# Patient Record
Sex: FEMALE | Race: WHITE
[De-identification: names, ages, dates, MRNs, and addresses within clinical notes are randomized per-mention and may not be internally consistent; named-entity substitution may affect disease eponyms.]

---

## 2021-06-23 PROBLEM — Z00.00 ENCOUNTER FOR PREVENTIVE HEALTH EXAMINATION: Status: ACTIVE | Noted: 2021-06-23

## 2021-07-08 ENCOUNTER — APPOINTMENT (OUTPATIENT)
Dept: OTOLARYNGOLOGY | Facility: CLINIC | Age: 86
End: 2021-07-08
Payer: MEDICARE

## 2021-07-08 VITALS — HEIGHT: 65.5 IN | WEIGHT: 126 LBS | BODY MASS INDEX: 20.74 KG/M2 | TEMPERATURE: 97.3 F

## 2021-07-08 DIAGNOSIS — H92.03 OTALGIA, BILATERAL: ICD-10-CM

## 2021-07-08 DIAGNOSIS — Z86.39 PERSONAL HISTORY OF OTHER ENDOCRINE, NUTRITIONAL AND METABOLIC DISEASE: ICD-10-CM

## 2021-07-08 DIAGNOSIS — K21.9 GASTRO-ESOPHAGEAL REFLUX DISEASE W/OUT ESOPHAGITIS: ICD-10-CM

## 2021-07-08 PROCEDURE — 99072 ADDL SUPL MATRL&STAF TM PHE: CPT

## 2021-07-08 PROCEDURE — 31575 DIAGNOSTIC LARYNGOSCOPY: CPT

## 2021-07-08 PROCEDURE — 99204 OFFICE O/P NEW MOD 45 MIN: CPT | Mod: 25

## 2021-07-08 RX ORDER — METOPROLOL TARTRATE 75 MG/1
TABLET, FILM COATED ORAL
Refills: 0 | Status: ACTIVE | COMMUNITY

## 2021-07-08 RX ORDER — ATORVASTATIN CALCIUM 40 MG/1
40 TABLET, FILM COATED ORAL
Refills: 0 | Status: ACTIVE | COMMUNITY

## 2021-07-08 RX ORDER — LEVOTHYROXINE SODIUM 0.17 MG/1
TABLET ORAL
Refills: 0 | Status: ACTIVE | COMMUNITY

## 2021-07-08 RX ORDER — AMLODIPINE BESYLATE 10 MG/1
10 TABLET ORAL
Refills: 0 | Status: ACTIVE | COMMUNITY

## 2021-07-08 RX ORDER — MECLIZINE HYDROCHLORIDE 25 MG/1
25 TABLET ORAL
Refills: 0 | Status: ACTIVE | COMMUNITY

## 2021-07-08 RX ORDER — ALPRAZOLAM 0.5 MG/1
0.5 TABLET ORAL
Refills: 0 | Status: ACTIVE | COMMUNITY

## 2021-07-08 NOTE — ASSESSMENT
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 I suggested and discussed in detail a strict reflux diet and gave the patient a handout.\par \par I am recommending Pepcid 20mg  bid\par \par She has cervical spasm and C spine disease which I think is causing the pain behind her ears.\par \par RTC 4 weeks.

## 2021-07-08 NOTE — REVIEW OF SYSTEMS
[Ear Pain] : ear pain [Vertigo] : vertigo [Hoarseness] : hoarseness [Throat Clearing] : throat clearing [Negative] : Heme/Lymph [Patient Intake Form Reviewed] : Patient intake form was reviewed [de-identified] : difficulty breathing, reflux/hearburn [FreeTextEntry4] : neck pain

## 2021-07-08 NOTE — HISTORY OF PRESENT ILLNESS
[de-identified] : MATTHEW COFFMAN is a 87 year woman with a history of GERD who complains of LPR symptoms including  throat clearing, PND, and cough. She has been having long history of bilateral ear pain and pain over the mastoid areas.She has problem with her hearing. She has vacular migraines.

## 2024-03-21 ENCOUNTER — APPOINTMENT (OUTPATIENT)
Dept: NEPHROLOGY | Facility: CLINIC | Age: 89
End: 2024-03-21
Payer: MEDICARE

## 2024-03-21 VITALS
BODY MASS INDEX: 20.74 KG/M2 | HEIGHT: 65.5 IN | DIASTOLIC BLOOD PRESSURE: 85 MMHG | WEIGHT: 126 LBS | SYSTOLIC BLOOD PRESSURE: 148 MMHG

## 2024-03-21 VITALS — SYSTOLIC BLOOD PRESSURE: 152 MMHG | DIASTOLIC BLOOD PRESSURE: 85 MMHG

## 2024-03-21 VITALS — SYSTOLIC BLOOD PRESSURE: 168 MMHG | DIASTOLIC BLOOD PRESSURE: 87 MMHG

## 2024-03-21 DIAGNOSIS — L40.50 ARTHROPATHIC PSORIASIS, UNSPECIFIED: ICD-10-CM

## 2024-03-21 DIAGNOSIS — I25.10 ATHEROSCLEROTIC HEART DISEASE OF NATIVE CORONARY ARTERY W/OUT ANGINA PECTORIS: ICD-10-CM

## 2024-03-21 DIAGNOSIS — I10 ESSENTIAL (PRIMARY) HYPERTENSION: ICD-10-CM

## 2024-03-21 DIAGNOSIS — E78.00 PURE HYPERCHOLESTEROLEMIA, UNSPECIFIED: ICD-10-CM

## 2024-03-21 DIAGNOSIS — M48.00 SPINAL STENOSIS, SITE UNSPECIFIED: ICD-10-CM

## 2024-03-21 DIAGNOSIS — E03.9 HYPOTHYROIDISM, UNSPECIFIED: ICD-10-CM

## 2024-03-21 PROCEDURE — 99205 OFFICE O/P NEW HI 60 MIN: CPT

## 2024-03-21 PROCEDURE — 93784 AMBL BP MNTR W/SOFTWARE: CPT

## 2024-03-21 NOTE — HISTORY OF PRESENT ILLNESS
[FreeTextEntry1] : 89-year-old woman who is self-referred because of hypertension.  She has been hypertensive for at least 30 years, and difficult to control.  She is currently on amlodipine 5 mg daily and losartan 100 mg daily, but complains of headaches, dizziness, and near fainting.  She is very unstable on her feet and has had multiple falls, and uses a walker.  She has a history of hypercholesterolemia and coronary artery disease.  She had an MRA of the head and neck in November, showing stable mild microvascular ischemic disease, and ulcerated plaque in the right internal carotid but no significant stenosis.  She insists that she has a 50% stenosis but it is not documented.  Vertebral arteries are patent bilaterally.  MRI of the brain shows no acute infarction or mass lesion.  She is on atorvastatin 80 mg daily but says her cholesterol still not controlled.  She has had blood work but did not bring any of it.  She complains of edema of both lower extremities but it is not at all impressive on exam.  She has a history of spinal stenosis and psoriatic arthritis, but does not use NSAIDs.  She has been told that her bladder does not empty fully and she has chronic constipation.  She underwent percutaneous coronary angioplasty about 4 years ago.  She has been told she has whitecoat effect with readings that are much higher in doctors offices.  She went to the ER at [facility] with a BP of 230/130 last year.  By history, she has had 5 TIAs, but no infarcts were noted on MRI of the brain.

## 2024-03-21 NOTE — ASSESSMENT
[FreeTextEntry1] : 89-year-old woman with longstanding hypertension, history of whitecoat effect, with complaints of headaches, dizziness, and multiple falls with unstable gait/instability.  Her systolic BP is elevated here but without an orthostatic fall of any significance.  She insists she has a 50% carotid stenosis but that is not what her MRA report suggests -they indicate no flow-limiting stenoses but the right carotid ulcerated plaque.  She has had 5 "mini strokes, presumably TIAs.  There is no question she has generalized vascular disease judging by her coronary history, microvascular ischemic disease in the brain, epigastric bruit heard on exam -atherosclerotic renal artery stenosis is possible.  Unfortunately I have no labs and I am reluctant to change antihypertensive medication without more information both on labs  and what her real world blood pressures are like.   I have asked her to wear.  24-hour ambulatory BP recorder from today till tomorrow to guide therapy.  Time spent 55 minutes

## 2024-03-21 NOTE — PHYSICAL EXAM
[General Appearance - Alert] : alert [General Appearance - In No Acute Distress] : in no acute distress [Sclera] : the sclera and conjunctiva were normal [PERRL With Normal Accommodation] : pupils were equal in size, round, and reactive to light [Extraocular Movements] : extraocular movements were intact [Oropharynx] : the oropharynx was normal [Outer Ear] : the ears and nose were normal in appearance [Neck Appearance] : the appearance of the neck was normal [Neck Cervical Mass (___cm)] : no neck mass was observed [Thyroid Diffuse Enlargement] : the thyroid was not enlarged [Jugular Venous Distention Increased] : there was no jugular-venous distention [Thyroid Nodule] : there were no palpable thyroid nodules [Auscultation Breath Sounds / Voice Sounds] : lungs were clear to auscultation bilaterally [Heart Sounds] : normal S1 and S2 [Heart Rate And Rhythm] : heart rate was normal and rhythm regular [Heart Sounds Gallop] : no gallops [Murmurs] : no murmurs [Heart Sounds Pericardial Friction Rub] : no pericardial rub [Full Pulse] : the pedal pulses are present [Edema] : there was no peripheral edema [Bowel Sounds] : normal bowel sounds [Abdomen Soft] : soft [Abdomen Tenderness] : non-tender [Abdomen Mass (___ Cm)] : no abdominal mass palpated [Skin Color & Pigmentation] : normal skin color and pigmentation [Skin Turgor] : normal skin turgor [] : no rash [Deep Tendon Reflexes (DTR)] : deep tendon reflexes were 2+ and symmetric [Sensation] : the sensory exam was normal to light touch and pinprick [Oriented To Time, Place, And Person] : oriented to person, place, and time [No Focal Deficits] : no focal deficits [Impaired Insight] : insight and judgment were intact [Affect] : the affect was normal [FreeTextEntry1] : epig bruit